# Patient Record
Sex: FEMALE | Race: OTHER | URBAN - METROPOLITAN AREA
[De-identification: names, ages, dates, MRNs, and addresses within clinical notes are randomized per-mention and may not be internally consistent; named-entity substitution may affect disease eponyms.]

---

## 2020-04-07 ENCOUNTER — INPATIENT (INPATIENT)
Facility: HOSPITAL | Age: 70
LOS: 0 days | Discharge: HOME | End: 2020-04-08
Attending: HOSPITALIST | Admitting: HOSPITALIST
Payer: COMMERCIAL

## 2020-04-07 VITALS
HEIGHT: 61 IN | OXYGEN SATURATION: 92 % | RESPIRATION RATE: 20 BRPM | DIASTOLIC BLOOD PRESSURE: 72 MMHG | SYSTOLIC BLOOD PRESSURE: 157 MMHG | TEMPERATURE: 99 F | HEART RATE: 84 BPM | WEIGHT: 115.08 LBS

## 2020-04-07 LAB
ALBUMIN SERPL ELPH-MCNC: 3.8 G/DL — SIGNIFICANT CHANGE UP (ref 3.5–5.2)
ALP SERPL-CCNC: 95 U/L — SIGNIFICANT CHANGE UP (ref 30–115)
ALT FLD-CCNC: 20 U/L — SIGNIFICANT CHANGE UP (ref 0–41)
ANION GAP SERPL CALC-SCNC: 13 MMOL/L — SIGNIFICANT CHANGE UP (ref 7–14)
AST SERPL-CCNC: 39 U/L — SIGNIFICANT CHANGE UP (ref 0–41)
BASOPHILS # BLD AUTO: 0 K/UL — SIGNIFICANT CHANGE UP (ref 0–0.2)
BASOPHILS NFR BLD AUTO: 0 % — SIGNIFICANT CHANGE UP (ref 0–1)
BILIRUB SERPL-MCNC: 0.3 MG/DL — SIGNIFICANT CHANGE UP (ref 0.2–1.2)
BUN SERPL-MCNC: 12 MG/DL — SIGNIFICANT CHANGE UP (ref 10–20)
CALCIUM SERPL-MCNC: 8.8 MG/DL — SIGNIFICANT CHANGE UP (ref 8.5–10.1)
CHLORIDE SERPL-SCNC: 103 MMOL/L — SIGNIFICANT CHANGE UP (ref 98–110)
CO2 SERPL-SCNC: 24 MMOL/L — SIGNIFICANT CHANGE UP (ref 17–32)
CREAT SERPL-MCNC: 0.7 MG/DL — SIGNIFICANT CHANGE UP (ref 0.7–1.5)
D DIMER BLD IA.RAPID-MCNC: 205 NG/ML DDU — SIGNIFICANT CHANGE UP (ref 0–230)
EOSINOPHIL # BLD AUTO: 0 K/UL — SIGNIFICANT CHANGE UP (ref 0–0.7)
EOSINOPHIL NFR BLD AUTO: 0 % — SIGNIFICANT CHANGE UP (ref 0–8)
GLUCOSE SERPL-MCNC: 119 MG/DL — HIGH (ref 70–99)
HCT VFR BLD CALC: 34.1 % — LOW (ref 37–47)
HGB BLD-MCNC: 11.6 G/DL — LOW (ref 12–16)
IMM GRANULOCYTES NFR BLD AUTO: 0.5 % — HIGH (ref 0.1–0.3)
LYMPHOCYTES # BLD AUTO: 0.31 K/UL — LOW (ref 1.2–3.4)
LYMPHOCYTES # BLD AUTO: 5.5 % — LOW (ref 20.5–51.1)
MCHC RBC-ENTMCNC: 31.3 PG — HIGH (ref 27–31)
MCHC RBC-ENTMCNC: 34 G/DL — SIGNIFICANT CHANGE UP (ref 32–37)
MCV RBC AUTO: 91.9 FL — SIGNIFICANT CHANGE UP (ref 81–99)
MONOCYTES # BLD AUTO: 0.68 K/UL — HIGH (ref 0.1–0.6)
MONOCYTES NFR BLD AUTO: 12 % — HIGH (ref 1.7–9.3)
NEUTROPHILS # BLD AUTO: 4.66 K/UL — SIGNIFICANT CHANGE UP (ref 1.4–6.5)
NEUTROPHILS NFR BLD AUTO: 82 % — HIGH (ref 42.2–75.2)
NRBC # BLD: 0 /100 WBCS — SIGNIFICANT CHANGE UP (ref 0–0)
PLATELET # BLD AUTO: 227 K/UL — SIGNIFICANT CHANGE UP (ref 130–400)
POTASSIUM SERPL-MCNC: 4.3 MMOL/L — SIGNIFICANT CHANGE UP (ref 3.5–5)
POTASSIUM SERPL-SCNC: 4.3 MMOL/L — SIGNIFICANT CHANGE UP (ref 3.5–5)
PROT SERPL-MCNC: 6.9 G/DL — SIGNIFICANT CHANGE UP (ref 6–8)
RBC # BLD: 3.71 M/UL — LOW (ref 4.2–5.4)
RBC # FLD: 11.8 % — SIGNIFICANT CHANGE UP (ref 11.5–14.5)
SODIUM SERPL-SCNC: 140 MMOL/L — SIGNIFICANT CHANGE UP (ref 135–146)
WBC # BLD: 5.68 K/UL — SIGNIFICANT CHANGE UP (ref 4.8–10.8)
WBC # FLD AUTO: 5.68 K/UL — SIGNIFICANT CHANGE UP (ref 4.8–10.8)

## 2020-04-07 PROCEDURE — 71045 X-RAY EXAM CHEST 1 VIEW: CPT | Mod: 26

## 2020-04-07 PROCEDURE — 99285 EMERGENCY DEPT VISIT HI MDM: CPT

## 2020-04-07 PROCEDURE — 93010 ELECTROCARDIOGRAM REPORT: CPT

## 2020-04-07 RX ORDER — ACETAMINOPHEN 500 MG
975 TABLET ORAL ONCE
Refills: 0 | Status: COMPLETED | OUTPATIENT
Start: 2020-04-07 | End: 2020-04-07

## 2020-04-07 RX ORDER — HYDROCHLOROTHIAZIDE 25 MG
12.5 TABLET ORAL DAILY
Refills: 0 | Status: DISCONTINUED | OUTPATIENT
Start: 2020-04-07 | End: 2020-04-08

## 2020-04-07 RX ORDER — HEPARIN SODIUM 5000 [USP'U]/ML
5000 INJECTION INTRAVENOUS; SUBCUTANEOUS EVERY 8 HOURS
Refills: 0 | Status: DISCONTINUED | OUTPATIENT
Start: 2020-04-07 | End: 2020-04-08

## 2020-04-07 RX ORDER — TELMISARTAN 20 MG/1
1 TABLET ORAL
Qty: 0 | Refills: 0 | DISCHARGE

## 2020-04-07 RX ORDER — LOSARTAN POTASSIUM 100 MG/1
50 TABLET, FILM COATED ORAL DAILY
Refills: 0 | Status: DISCONTINUED | OUTPATIENT
Start: 2020-04-07 | End: 2020-04-08

## 2020-04-07 RX ORDER — HYDROXYCHLOROQUINE SULFATE 200 MG
TABLET ORAL
Refills: 0 | Status: DISCONTINUED | OUTPATIENT
Start: 2020-04-07 | End: 2020-04-08

## 2020-04-07 RX ADMIN — Medication 975 MILLIGRAM(S): at 21:36

## 2020-04-07 NOTE — ED PROVIDER NOTE - NS ED ROS FT
Constitutional: (+) fever  Eyes/ENT:  (-) visual changes   Cardiovascular: (-) chest pain, (-) syncope  Respiratory: (+) cough, (+) shortness of breath  Gastrointestinal: (-) vomiting, (-) diarrhea  Genitourinary: (-) dysuria, (-) hesitancy, (-) frequency   Musculoskeletal: (-) neck pain, (-) back pain, (-) joint pain  Integumentary: (-) rash, (-) edema  Neurological: (-) headache, (-) altered mental status  Allergic/Immunologic: (-) pruritus

## 2020-04-07 NOTE — H&P ADULT - HISTORY OF PRESENT ILLNESS
70 Year Old Female with a PMH of HTN presents to the ED c/o fevers, cough, SOB, sore throat that started on Monday. 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 She states that she has a decrease in appetite but has been able to tolerate PO and denies n/v/d. She also denies any other symptoms including atypical headache, recent illness/travel, abdominal pain, or chest pain.

## 2020-04-07 NOTE — H&P ADULT - ATTENDING COMMENTS
HPI as above.  Interval history: Pt seen and examined at bedside. No cp or sob.  Pt states that past 3 days she has been having a cough, sore throat and sob. She works as a NH aide   Vital Signs (24 Hrs):  T(C): 36.6 (04-08-20 @ 01:30), Max: 37.4 (04-07-20 @ 19:19)  HR: 74 (04-08-20 @ 01:30) (72 - 84)  BP: 155/72 (04-08-20 @ 01:30) (155/72 - 157/72)  RR: 19 (04-08-20 @ 01:30) (19 - 20)  SpO2: 99% (04-08-20 @ 01:30) (92% - 99%)  Wt(kg): --  Daily Height in cm: 154.94 (07 Apr 2020 19:19)    Daily     I&O's Summary    PHYSICAL EXAM:  GENERAL: NAD, well-developed  HEAD:  Atraumatic, Normocephalic  EYES: EOMI, PERRLA, conjunctiva and sclera clear  NECK: Supple, No JVD  CHEST/LUNG: Clear to auscultation bilaterally; No wheeze  HEART: Regular rate and rhythm; No murmurs, rubs, or gallops  ABDOMEN: Soft, Nontender, Nondistended; Bowel sounds present  EXTREMITIES:  2+ Peripheral Pulses, No clubbing, cyanosis, or edema  PSYCH: AAOx3  NEUROLOGY: non-focal  SKIN: No rashes or lesions    Labs reviewed  Imaging reviewedL:< from: Xray Chest 1 View-PORTABLE IMMEDIATE (04.07.20 @ 22:13) >    1. Scattered bilateral airspace opacities, suspicious for pneumonia in the appropriate clinical setting.    < end of copied text >      EKG reviewed: < from: 12 Lead ECG (04.07.20 @ 20:11) >    Diagnosis Line Normal sinus rhythm  Abnormal QRS-T angle, consider primary T wave abnormality  Abnormal ECG    < end of copied text >    qtc 415    Plan  #SOB 2/2 suspected COVID pneumonia   no in resp failure maintained o2 >90%   no diarrhea, lymphopenia, dimer wnl, no LFT changes but still early in course   -follow up covid   -hcq and azithro qtc 415 stable  - will dc nc o2, check RA   -follow up COVID inflammatory markers   ID if needed    # rest as above      #Progress Note Handoff  Pending (specify): supportive care  Family discussion: rigoberto pt and agreed to plan  Disposition: Home_x__/SNF___/Other________/Unknown at this time________

## 2020-04-07 NOTE — H&P ADULT - NSHPPHYSICALEXAM_GEN_ALL_CORE
PHYSICAL EXAM:  GENERAL: NAD, speaks in full sentences, no signs of respiratory distress  HEAD:  Atraumatic, Normocephalic  EYES: EOMI, PERRLA, conjunctiva and sclera clear  NECK: Supple, No JVD  CHEST/LUNG: Good air entry bilaterally, no wheeze or crackles appreciated.   HEART: Regular rate and rhythm; No murmurs;   ABDOMEN: Soft, Nontender, Nondistended; Bowel sounds present; No guarding  EXTREMITIES:  2+ Peripheral Pulses, No cyanosis or edema  PSYCH: AAOx3  NEUROLOGY: non-focal  SKIN: No rashes or lesions

## 2020-04-07 NOTE — ED ADULT TRIAGE NOTE - CHIEF COMPLAINT QUOTE
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

## 2020-04-07 NOTE — H&P ADULT - ASSESSMENT
70 Year Old Female with a PMH of HTN presents to the ED c/o fevers, cough, SOB, sore throat that started on Monday. 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    1) Hypoxia likely secondary to Viral Pneumonia   CXR suspicious for Viral Pneumonia   Continue with Oxygen Supplementation   Maintain oxygen saturation above 92%  EKG shows QTc of 415   Start Hydroxychloroquine   Procalcitonin and CRP Pending     2) HTN   Stable at this time   Continue with Home Medications     3) DVT Prophylaxis   Heparin Sub Q     4) Disposition   Patient admitted to medicine 70 Year Old Female with a PMH of HTN presents to the ED c/o fevers, cough, SOB, sore throat that started on Monday. 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    1) Hypoxia likely secondary to Viral Pneumonia   CXR suspicious for Viral Pneumonia   Continue with Oxygen Supplementation   Maintain oxygen saturation above 92%  EKG shows QTc of 415   Start Hydroxychloroquine   Procalcitonin and CRP Pending     2) HTN   Stable at this time   Continue with Losartan 50 and Hydrochlorothiazide 12.5mg.     3) DVT Prophylaxis   Heparin Sub Q     4) Disposition   Patient admitted to medicine

## 2020-04-07 NOTE — ED PROVIDER NOTE - CLINICAL SUMMARY MEDICAL DECISION MAKING FREE TEXT BOX
70yo  F, hx of HTN, non smoker here for assessment of cough, fever, dyspnea x  2 days. No recent travel. Has multiple sick contacts at work in [place]  VS notable for fever with appropriate tachycardia, hypoxia on RA. patient has clear lungs, regular rhythm, soft, NT, ND abdomen, no rash no LE edema.  Labs unremarkable , XR suggestive of covid. Despite treatment in ED, patient with significant symptoms, hypoxia with minimal exertion, requiring supplemental O2 at rest, high risk for respiratory failure.   Will admit for monitoring, O2 and further management of moderate covid infection with pneumonia.

## 2020-04-07 NOTE — ED PROVIDER NOTE - OBJECTIVE STATEMENT
71 yo female with a pmh of HTN presents c/o fevers/cough/SOB/sore throat that started on Monday. 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 pt states to have a decrease in appetite but has been able to tolerate PO and denies n/v/d. denies any other symptoms including atypical headache, recent illness/travel, abdominal pain, or chest pain.

## 2020-04-07 NOTE — H&P ADULT - NSHPLABSRESULTS_GEN_ALL_CORE
11.6   5.68  )-----------( 227      ( 07 Apr 2020 21:20 )             34.1     04-07    140  |  103  |  12  ----------------------------<  119<H>  4.3   |  24  |  0.7    Ca    8.8      07 Apr 2020 21:20    TPro  6.9  /  Alb  3.8  /  TBili  0.3  /  DBili  x   /  AST  39  /  ALT  20  /  AlkPhos  95  04-07    < from: Xray Chest 1 View-PORTABLE IMMEDIATE (04.07.20 @ 22:13) >    Impression:      1. Scattered bilateral airspace opacities, suspicious for pneumonia in the appropriate clinical setting.    < end of copied text >

## 2020-04-07 NOTE — ED PROVIDER NOTE - PHYSICAL EXAMINATION
Gen: NAD, AOx3  Head: NCAT  HEENT: PERRL, oral mucosa moist, normal conjunctiva, oropharynx clear without exudate or erythema  Lung: CTAB, no respiratory distress, no wheezing, rales, rhonchi  CV: normal s1/s2, rrr, Normal perfusion, pulses 2+ throughout  Abd: soft, NTND, no CVA tenderness  Genitourinary: no pelvic tenderness  MSK: No edema, no visible deformities, full range of motion in all 4 extremities  Neuro: No focal neurologic deficits  Skin: No rash   Psych: normal affect

## 2020-04-08 VITALS
TEMPERATURE: 100 F | SYSTOLIC BLOOD PRESSURE: 129 MMHG | RESPIRATION RATE: 20 BRPM | OXYGEN SATURATION: 97 % | HEART RATE: 79 BPM | DIASTOLIC BLOOD PRESSURE: 61 MMHG

## 2020-04-08 LAB
ALBUMIN SERPL ELPH-MCNC: 3.7 G/DL — SIGNIFICANT CHANGE UP (ref 3.5–5.2)
ALP SERPL-CCNC: 95 U/L — SIGNIFICANT CHANGE UP (ref 30–115)
ALT FLD-CCNC: 20 U/L — SIGNIFICANT CHANGE UP (ref 0–41)
ANION GAP SERPL CALC-SCNC: 15 MMOL/L — HIGH (ref 7–14)
AST SERPL-CCNC: 42 U/L — HIGH (ref 0–41)
BASOPHILS # BLD AUTO: 0.01 K/UL — SIGNIFICANT CHANGE UP (ref 0–0.2)
BASOPHILS NFR BLD AUTO: 0.2 % — SIGNIFICANT CHANGE UP (ref 0–1)
BILIRUB SERPL-MCNC: 0.3 MG/DL — SIGNIFICANT CHANGE UP (ref 0.2–1.2)
BUN SERPL-MCNC: 15 MG/DL — SIGNIFICANT CHANGE UP (ref 10–20)
CALCIUM SERPL-MCNC: 8.9 MG/DL — SIGNIFICANT CHANGE UP (ref 8.5–10.1)
CHLORIDE SERPL-SCNC: 101 MMOL/L — SIGNIFICANT CHANGE UP (ref 98–110)
CO2 SERPL-SCNC: 24 MMOL/L — SIGNIFICANT CHANGE UP (ref 17–32)
CREAT SERPL-MCNC: 0.8 MG/DL — SIGNIFICANT CHANGE UP (ref 0.7–1.5)
CRP SERPL-MCNC: 3.21 MG/DL — HIGH (ref 0–0.4)
EOSINOPHIL # BLD AUTO: 0 K/UL — SIGNIFICANT CHANGE UP (ref 0–0.7)
EOSINOPHIL NFR BLD AUTO: 0 % — SIGNIFICANT CHANGE UP (ref 0–8)
FERRITIN SERPL-MCNC: 234 NG/ML — HIGH (ref 15–150)
GLUCOSE SERPL-MCNC: 114 MG/DL — HIGH (ref 70–99)
HCT VFR BLD CALC: 35.1 % — LOW (ref 37–47)
HCV AB S/CO SERPL IA: 0.03 COI — SIGNIFICANT CHANGE UP
HCV AB SERPL-IMP: SIGNIFICANT CHANGE UP
HGB BLD-MCNC: 11.3 G/DL — LOW (ref 12–16)
IMM GRANULOCYTES NFR BLD AUTO: 0.7 % — HIGH (ref 0.1–0.3)
LYMPHOCYTES # BLD AUTO: 0.39 K/UL — LOW (ref 1.2–3.4)
LYMPHOCYTES # BLD AUTO: 6.4 % — LOW (ref 20.5–51.1)
MCHC RBC-ENTMCNC: 30.1 PG — SIGNIFICANT CHANGE UP (ref 27–31)
MCHC RBC-ENTMCNC: 32.2 G/DL — SIGNIFICANT CHANGE UP (ref 32–37)
MCV RBC AUTO: 93.4 FL — SIGNIFICANT CHANGE UP (ref 81–99)
MONOCYTES # BLD AUTO: 0.86 K/UL — HIGH (ref 0.1–0.6)
MONOCYTES NFR BLD AUTO: 14.2 % — HIGH (ref 1.7–9.3)
NEUTROPHILS # BLD AUTO: 4.76 K/UL — SIGNIFICANT CHANGE UP (ref 1.4–6.5)
NEUTROPHILS NFR BLD AUTO: 78.5 % — HIGH (ref 42.2–75.2)
NRBC # BLD: 0 /100 WBCS — SIGNIFICANT CHANGE UP (ref 0–0)
PLATELET # BLD AUTO: 212 K/UL — SIGNIFICANT CHANGE UP (ref 130–400)
POTASSIUM SERPL-MCNC: 4.3 MMOL/L — SIGNIFICANT CHANGE UP (ref 3.5–5)
POTASSIUM SERPL-SCNC: 4.3 MMOL/L — SIGNIFICANT CHANGE UP (ref 3.5–5)
PROCALCITONIN SERPL-MCNC: 0.05 NG/ML — SIGNIFICANT CHANGE UP (ref 0.02–0.1)
PROT SERPL-MCNC: 6.8 G/DL — SIGNIFICANT CHANGE UP (ref 6–8)
RBC # BLD: 3.76 M/UL — LOW (ref 4.2–5.4)
RBC # FLD: 11.9 % — SIGNIFICANT CHANGE UP (ref 11.5–14.5)
SARS-COV-2 RNA SPEC QL NAA+PROBE: DETECTED
SODIUM SERPL-SCNC: 140 MMOL/L — SIGNIFICANT CHANGE UP (ref 135–146)
WBC # BLD: 6.06 K/UL — SIGNIFICANT CHANGE UP (ref 4.8–10.8)
WBC # FLD AUTO: 6.06 K/UL — SIGNIFICANT CHANGE UP (ref 4.8–10.8)

## 2020-04-08 PROCEDURE — 99223 1ST HOSP IP/OBS HIGH 75: CPT

## 2020-04-08 RX ORDER — HYDROXYCHLOROQUINE SULFATE 200 MG
200 TABLET ORAL EVERY 12 HOURS
Refills: 0 | Status: DISCONTINUED | OUTPATIENT
Start: 2020-04-09 | End: 2020-04-08

## 2020-04-08 RX ORDER — HYDROXYCHLOROQUINE SULFATE 200 MG
1 TABLET ORAL
Qty: 8 | Refills: 0
Start: 2020-04-08 | End: 2020-04-11

## 2020-04-08 RX ORDER — GUAIFENESIN/DEXTROMETHORPHAN 600MG-30MG
10 TABLET, EXTENDED RELEASE 12 HR ORAL EVERY 6 HOURS
Refills: 0 | Status: DISCONTINUED | OUTPATIENT
Start: 2020-04-08 | End: 2020-04-08

## 2020-04-08 RX ORDER — AZITHROMYCIN 500 MG/1
250 TABLET, FILM COATED ORAL DAILY
Refills: 0 | Status: DISCONTINUED | OUTPATIENT
Start: 2020-04-09 | End: 2020-04-08

## 2020-04-08 RX ORDER — HYDROXYCHLOROQUINE SULFATE 200 MG
400 TABLET ORAL EVERY 12 HOURS
Refills: 0 | Status: COMPLETED | OUTPATIENT
Start: 2020-04-08 | End: 2020-04-08

## 2020-04-08 RX ORDER — AZITHROMYCIN 500 MG/1
500 TABLET, FILM COATED ORAL ONCE
Refills: 0 | Status: COMPLETED | OUTPATIENT
Start: 2020-04-08 | End: 2020-04-08

## 2020-04-08 RX ORDER — ACETAMINOPHEN 500 MG
650 TABLET ORAL EVERY 4 HOURS
Refills: 0 | Status: DISCONTINUED | OUTPATIENT
Start: 2020-04-08 | End: 2020-04-08

## 2020-04-08 RX ORDER — AZITHROMYCIN 500 MG/1
1 TABLET, FILM COATED ORAL
Qty: 3 | Refills: 0
Start: 2020-04-08 | End: 2020-04-10

## 2020-04-08 RX ADMIN — Medication 12.5 MILLIGRAM(S): at 04:11

## 2020-04-08 RX ADMIN — AZITHROMYCIN 500 MILLIGRAM(S): 500 TABLET, FILM COATED ORAL at 04:09

## 2020-04-08 RX ADMIN — Medication 10 MILLILITER(S): at 12:01

## 2020-04-08 RX ADMIN — HEPARIN SODIUM 5000 UNIT(S): 5000 INJECTION INTRAVENOUS; SUBCUTANEOUS at 12:00

## 2020-04-08 RX ADMIN — LOSARTAN POTASSIUM 50 MILLIGRAM(S): 100 TABLET, FILM COATED ORAL at 04:11

## 2020-04-08 RX ADMIN — Medication 10 MILLILITER(S): at 04:01

## 2020-04-08 RX ADMIN — Medication 10 MILLILITER(S): at 18:13

## 2020-04-08 RX ADMIN — Medication 400 MILLIGRAM(S): at 12:03

## 2020-04-08 RX ADMIN — HEPARIN SODIUM 5000 UNIT(S): 5000 INJECTION INTRAVENOUS; SUBCUTANEOUS at 04:10

## 2020-04-08 RX ADMIN — Medication 400 MILLIGRAM(S): at 02:06

## 2020-04-08 NOTE — PROGRESS NOTE ADULT - SUBJECTIVE AND OBJECTIVE BOX
I made rounds today with the treatment team including the hospitalist, residents,  nurses and  and discussed the patient's current medical status and discharge  planning needs, and reviewed the chart.    T(C): 37.1 (04-08-20 @ 08:40), Max: 37.4 (04-07-20 @ 19:19)  HR: 81 (04-08-20 @ 08:40) (72 - 84)  BP: 125/63 (04-08-20 @ 08:40) (125/63 - 157/72)  RR: 20 (04-08-20 @ 08:40) (19 - 20)  SpO2: 96% (04-08-20 @ 08:40) (92% - 99%)          I reached out to the patient's health care proxy/ responsible family member-           [ x    ]  I reached    her dtr                                 and discussed the patient's medical condition,                   family concerns, and discharge planning           [     ]  I left a message with family               [     ]  I personally participated in rounds with the medical team and my resident and discussed the case. My resident reached                   family member/ HCP                                under my direction and supervision  and we reviewed the conversaion.          [     ]  My resident left a message with family under my direction and supervision    The following was discussed:  Pulse ox is 96% on room air.  Possible discharge tomorrow oif continues to improve.  On zithromax and Plaquenil.  She is COVID +.  Pharmacy is [facility] on [address] [facility] in [facility].        [     ] I spent 5-10 minutes on the above discussing medical issues with team members and family and/ or my resident    [     ] I spent 11-20 minutes on the above discussing medical issues with team members and family and/ or my resident    [ x    ] I spent 21-30 minutes on the above discussing medical issues with team members and family and/ or my resident

## 2020-04-08 NOTE — DISCHARGE NOTE PROVIDER - NSDCCPCAREPLAN_GEN_ALL_CORE_FT
PRINCIPAL DISCHARGE DIAGNOSIS  Diagnosis: SOB (shortness of breath)  Assessment and Plan of Treatment: you were tested positive for COVID, you condition were stable , and your never used supplemental oxygen . you should isolate yourself at home, minimize contact with everyone , try to use your own bathroom if possible. If you develop shortness of breath , chest pain or high grade fever , call 911 or come to the ED.

## 2020-04-08 NOTE — DISCHARGE NOTE PROVIDER - CARE PROVIDER_API CALL
Parker Pierre)  Geriatric Medicine; Internal Medicine  [address] [place] [address]  Phone: [phone]  Fax: [phone]  Follow Up Time: 2 weeks

## 2020-04-08 NOTE — PROGRESS NOTE ADULT - SUBJECTIVE AND OBJECTIVE BOX
24H events:    Patient is a 70y old Female who presents with a chief complaint of Hypoxia (07 Apr 2020 23:13)    Primary diagnosis of SOB (shortness of breath)     Today is hospital day 1d. This morning patient was seen and examined at bedside, resting comfortably in bed.      PAST MEDICAL & SURGICAL HISTORY  HTN (hypertension)    SOCIAL HISTORY:  Social History:  denies, tobacco, alcohol or drug use. (07 Apr 2020 23:13)      ALLERGIES:  No Known Allergies    MEDICATIONS:  STANDING MEDICATIONS  guaifenesin/dextromethorphan  Syrup 10 milliLiter(s) Oral every 6 hours  heparin  Injectable 5000 Unit(s) SubCutaneous every 8 hours  hydrochlorothiazide 12.5 milliGRAM(s) Oral daily  hydroxychloroquine   Oral   hydroxychloroquine 400 milliGRAM(s) Oral every 12 hours  losartan 50 milliGRAM(s) Oral daily    PRN MEDICATIONS  acetaminophen   Tablet .. 650 milliGRAM(s) Oral every 4 hours PRN    VITALS:   T(F): 98.7  HR: 81  BP: 125/63  RR: 20  SpO2: 96%    LABS:                        11.3   6.06  )-----------( 212      ( 08 Apr 2020 07:40 )             35.1     04-08    140  |  101  |  15  ----------------------------<  114<H>  4.3   |  24  |  0.8    Ca    8.9      08 Apr 2020 07:40    TPro  6.8  /  Alb  3.7  /  TBili  0.3  /  DBili  x   /  AST  42<H>  /  ALT  20  /  AlkPhos  95  04-08            RADIOLOGY:  < from: Xray Chest 1 View-PORTABLE IMMEDIATE (04.07.20 @ 22:13) >  Impression:      1. Scattered bilateral airspace opacities, suspicious for pneumonia in the appropriate clinical setting.    < end of copied text >        PHYSICAL EXAM:  GENERAL: NAD, speaks in full sentences, no signs of respiratory distress  HEAD:  Atraumatic, Normocephalic  NECK: Supple  CHEST/LUNG: Good air entry bilaterally, no wheeze or crackles appreciated.   HEART: Regular rate and rhythm; No murmurs;   ABDOMEN: Soft, Nontender, Nondistended; Bowel sounds present; No guarding  EXTREMITIES:  2+ Peripheral Pulses, No cyanosis or edema  PSYCH: AAOx3  NEUROLOGY: non-focal  SKIN: No rashes or lesions        Assessment and Plan:   Assessment:  · Assessment		  70 Year Old Female with a PMH of HTN presents to the ED c/o fevers, cough, SOB, sore throat that started on Monday. 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    # hypoxic respiratory failure secondary to COVID Pneumonia   - satting 94% on RA, afebrile  - COVID PCR positive   - CXR : b/l scattered airspace opacities   - Continue with Oxygen Supplementation   - Maintain oxygen saturation above 92%  - EKG shows QTc of 415   - CW  Hydroxychloroquine   - D-dimer: 205, lymphopenia  - Procalcitonin and CRP Pending     # HTN   - Stable at this time   - Continue with Losartan 50 and Hydrochlorothiazide 12.5mg.     # DVT ppx:Heparin Sub Q   #diet: Dash  #GI ppx: not needed  # Full Code

## 2020-04-08 NOTE — DISCHARGE NOTE PROVIDER - HOSPITAL COURSE
70 Year Old Female with a PMH of HTN presents to the ED c/o fevers, cough, SOB, sore throat that started on Monday. 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        # hypoxic respiratory failure secondary to COVID Pneumonia     - satting 94% on RA, afebrile    - COVID PCR positive     - CXR : b/l scattered airspace opacities     - Continue with Oxygen Supplementation     - Maintain oxygen saturation above 92%    - EKG shows QTc of 415     - CW  Hydroxychloroquine     - D-dimer: 205, lymphopenia    - stabel for d/c        # HTN     - Stable at this time     - Continue with Losartan 50 and Hydrochlorothiazide 12.5mg.         # DVT ppx:Heparin Sub Q     #diet: Dash    #GI ppx: not needed    # Full Code

## 2020-04-08 NOTE — DISCHARGE NOTE PROVIDER - NSDCMRMEDTOKEN_GEN_ALL_CORE_FT
hydroCHLOROthiazide 12.5 mg oral tablet: 1 tab(s) orally once a day  telmisartan 40 mg oral tablet: 1 tab(s) orally once a day azithromycin 250 mg oral tablet: 1 tab(s) orally once a day  hydroCHLOROthiazide 12.5 mg oral tablet: 1 tab(s) orally once a day  hydroxychloroquine 200 mg oral tablet: 1 tab(s) orally 2 times a day  telmisartan 40 mg oral tablet: 1 tab(s) orally once a day

## 2020-04-08 NOTE — DISCHARGE NOTE NURSING/CASE MANAGEMENT/SOCIAL WORK - PATIENT PORTAL LINK FT
You can access the FollowMyHealth Patient Portal offered by [facility] by registering at the following website: [email or web address]. By joining [employer]’s FollowMyHealth portal, you will also be able to view your health information using other applications (apps) compatible with our system.

## 2020-04-10 DIAGNOSIS — U07.1 COVID-19: ICD-10-CM

## 2020-04-10 DIAGNOSIS — J12.89 OTHER VIRAL PNEUMONIA: ICD-10-CM

## 2020-04-10 DIAGNOSIS — J96.01 ACUTE RESPIRATORY FAILURE WITH HYPOXIA: ICD-10-CM

## 2020-04-10 DIAGNOSIS — I10 ESSENTIAL (PRIMARY) HYPERTENSION: ICD-10-CM

## 2020-04-10 DIAGNOSIS — R06.02 SHORTNESS OF BREATH: ICD-10-CM
